# Patient Record
Sex: FEMALE | Race: WHITE | NOT HISPANIC OR LATINO | URBAN - METROPOLITAN AREA
[De-identification: names, ages, dates, MRNs, and addresses within clinical notes are randomized per-mention and may not be internally consistent; named-entity substitution may affect disease eponyms.]

---

## 2017-05-26 ENCOUNTER — OFFICE VISIT (OUTPATIENT)
Dept: URGENT CARE | Facility: RETAIL CLINIC | Age: 37
End: 2017-05-26
Payer: COMMERCIAL

## 2017-05-26 VITALS — SYSTOLIC BLOOD PRESSURE: 113 MMHG | TEMPERATURE: 97.3 F | DIASTOLIC BLOOD PRESSURE: 76 MMHG | HEART RATE: 98 BPM

## 2017-05-26 DIAGNOSIS — R09.81 NASAL CONGESTION: ICD-10-CM

## 2017-05-26 DIAGNOSIS — J02.9 ACUTE PHARYNGITIS, UNSPECIFIED ETIOLOGY: ICD-10-CM

## 2017-05-26 DIAGNOSIS — Z77.120 MOLD SUSPECTED EXPOSURE: ICD-10-CM

## 2017-05-26 LAB — S PYO AG THROAT QL IA.RAPID: NORMAL

## 2017-05-26 PROCEDURE — 99202 OFFICE O/P NEW SF 15 MIN: CPT | Performed by: NURSE PRACTITIONER

## 2017-05-26 PROCEDURE — 87880 STREP A ASSAY W/OPTIC: CPT | Mod: QW | Performed by: NURSE PRACTITIONER

## 2017-05-26 PROCEDURE — 87081 CULTURE SCREEN ONLY: CPT | Performed by: NURSE PRACTITIONER

## 2017-05-26 NOTE — PROGRESS NOTES
[facility] clinic note    SUBJECTIVE:  Grace Du is a 36 year old female who presents to [facility] with chief complaint of sore throat.    Onset of symptoms was 3 day(s) ago.    Course of illness: sudden onset and worsening.    Severity moderate to severe  Course of illness:  Current and Associated symptoms: ears are clogged, runny/stuffy nose, felt warm, glands feel swollen, hoarse voice and headache  Treatment measures tried at home include OTC meds.  Predisposing factors include None.    Current Outpatient Prescriptions   Medication     Acetaminophen (TYLENOL PO)     No current facility-administered medications for this visit.      PAST MEDICAL HISTORY:   Past Medical History:   Diagnosis Date     ASTHMA UNSPECIFIED       IRON DEFIC ANEMIA NOS        PAST SURGICAL HISTORY: No past surgical history on file.    FAMILY HISTORY: No family history on file.    SOCIAL HISTORY:   Social History   Substance Use Topics     Smoking status: Not on file     Smokeless tobacco: Not on file     Alcohol use Not on file     ROS:  Review of systems negative except as stated above.    OBJECTIVE:   Vitals:    05/26/17 1743   BP: 113/76   Pulse: 98   Temp: 97.3  F (36.3  C)   TempSrc: Oral     GENERAL APPEARANCE: alert, active, mild distress and cooperative  EYES: EOMI,  PERRL, conjunctiva clear  HENT: ear canals and TM's normal.  Nose congestion.  oral mucous membranes moist, no erythema noted  NECK: supple, non-tender to palpation, no adenopathy noted  RESP: lungs clear to auscultation - no rales, rhonchi or wheezes  CV: regular rates and rhythm, normal S1 S2, no murmur noted  ABDOMEN:  soft, nontender, no HSM or masses and bowel sounds normal  SKIN: no suspicious lesions or rashes    Rapid Strep test is negative; await throat culture results.    ASSESSMENT:     Acute pharyngitis, unspecified etiology  Nasal congestion  Mold suspected exposure      PLAN:   Outpatient  Encounter Prescriptions as of 5/26/2017   Medication Sig Dispense Refill     Acetaminophen (TYLENOL PO)        [DISCONTINUED] DEPO-PROVERA 150 MG/ML IM SUSP 1 ml every 12 weeks (Patient not taking: No sig reported) .9 0     [DISCONTINUED] DEPO-PROVERA 150 MG/ML IM SUSP 1 ml every 12 weeks (Patient not taking: No sig reported) .9 0     No facility-administered encounter medications on file as of 5/26/2017.      If not improving Follow up at:  [facility] [phone]  Encourage good hydration (mainly water), may drink tea /c honey, warm chicken broth to sooth throat.  Soft foods may be preferred for several days.  Symptomatic treatment with warm Na+ H2O gargles, OTC analgesic, etc. discussed.   Strep culture pending.   Grace Du told positive cultures called only.  Rest as needed.  Follow-up with primary care provider if not improving.    If difficulty breathing or swallowing be seen immediately in the ED.    Mark De Leon MSN, APRN, Family NP-C  [facility]

## 2017-05-26 NOTE — MR AVS SNAPSHOT
"              After Visit Summary   2017    Grace Du    MRN: [id number]           Patient Information     Date Of Birth          1980        Visit Information        Provider Department      2017 5:50 PM Mark De Leon APRN [facility]        Today's Diagnoses     Acute pharyngitis, unspecified etiology    -  1    Nasal congestion        Mold suspected exposure           Follow-ups after your visit        Who to contact     You can reach your care team any time of the day by calling [phone].  Notification of test results:  If you have an abnormal lab result, we will notify you by phone as soon as possible.         Additional Information About Your Visit        MyChart Information     [employer] lets you send messages to your doctor, view your test results, renew your prescriptions, schedule appointments and more. To sign up, go to [email or web address] . Click on \"Log in\" on the left side of the screen, which will take you to the Welcome page. Then click on \"Sign up Now\" on the right side of the page.     You will be asked to enter the access code listed below, as well as some personal information. Please follow the directions to create your username and password.     Your access code is: [email or web address]  Expires: 2017  6:06 PM     Your access code will  in 90 days. If you need help or a new code, please call your [place] clinic or [phone].        Care EveryWhere ID     This is your [facility] EveryWhere ID. This could be used by other organizations to access your [place] medical records  [id number]        Your Vitals Were     Pulse Temperature                98 97.3  F (36.3  C) (Oral)           Blood Pressure from Last 3 Encounters:   17 113/76    Weight from Last 3 Encounters:   No data found for Wt              We Performed the Following     RAPID STREP SCREEN        Primary Care Provider Office Phone # Fax #    Karissa Calderón MD " [phone]       [facility] [address] [address]        Thank you!     Thank you for choosing [facility]  for your care. Our goal is always to provide you with excellent care. Hearing back from our patients is one way we can continue to improve our services. Please take a few minutes to complete the written survey that you may receive in the mail after your visit with us. Thank you!             Your Updated Medication List - Protect others around you: Learn how to safely use, store and throw away your medicines at www.disposemymeds.org.          This list is accurate as of: 5/26/17  6:06 PM.  Always use your most recent med list.                   Brand Name Dispense Instructions for use    TYLENOL PO

## 2017-05-26 NOTE — NURSING NOTE
Chief Complaint   Patient presents with     Pharyngitis     x 3 days, pt thinks feverish, tylenol taken at noon today       Initial /76  Pulse 98  Temp 97.3  F (36.3  C) (Oral) There is no height or weight on file to calculate BMI.  Medication Reconciliation: complete

## 2017-05-30 LAB — BETA STREP CONFIRM: NORMAL

## 2017-07-07 ENCOUNTER — OFFICE VISIT (OUTPATIENT)
Dept: URGENT CARE | Facility: RETAIL CLINIC | Age: 37
End: 2017-07-07
Payer: COMMERCIAL

## 2017-07-07 VITALS
SYSTOLIC BLOOD PRESSURE: 111 MMHG | DIASTOLIC BLOOD PRESSURE: 73 MMHG | OXYGEN SATURATION: 96 % | HEART RATE: 97 BPM | TEMPERATURE: 98.6 F

## 2017-07-07 DIAGNOSIS — K04.7 DENTAL INFECTION: ICD-10-CM

## 2017-07-07 DIAGNOSIS — K08.89 PAIN, DENTAL: Primary | ICD-10-CM

## 2017-07-07 PROCEDURE — 99213 OFFICE O/P EST LOW 20 MIN: CPT | Performed by: NURSE PRACTITIONER

## 2017-07-07 RX ORDER — CEPHALEXIN 500 MG/1
500 CAPSULE ORAL 3 TIMES DAILY
Qty: 42 CAPSULE | Refills: 0 | Status: SHIPPED | OUTPATIENT
Start: 2017-07-07 | End: 2017-07-21

## 2017-07-07 NOTE — PROGRESS NOTES
SUBJECTIVE:  Grace Du is a 36 year old female here today with tooth pain left upper.   Has been going on for 1 day(s).   Relieving Factors:  Ice   Worse with: talking, and the like, smiling, etc.  Symptoms have been sudden since that time.  Prior history of related problems: Yes, currently has a broken tooth and will be seeing the dentist soon (15th).    Past Medical, social, family histories, medications, and allergies reviewed and updated  Current Outpatient Prescriptions   Medication     Acetaminophen (TYLENOL PO)     No current facility-administered medications for this visit.        OBJECTIVE:   Vitals:    07/07/17 1731   BP: 111/73   BP Location: Right arm   Patient Position: Chair   Cuff Size: Adult Regular   Pulse: 97   Temp: 98.6  F (37  C)   TempSrc: Tympanic   SpO2: 96%     Eye exam is normal - SHIRLEY, EOMI, corneas normal.  EARS: canals clear, TM retracted not injected .  Oropharyngeal exam - Good hygeine. One tooth missing.   The neck is supple and free of adenopathy or masses, the thyroid is normal without enlargement or nodules.    ASSESSMENT:     Pain, dental  Dental infection      PLAN:  Current Outpatient Prescriptions   Medication     cephALEXin (KEFLEX) 500 MG capsule     Acetaminophen (TYLENOL PO)     No current facility-administered medications for this visit.      NaCl H2O rinses  Improve oral hygeine  Follow up with dentist  Tylenol 1-2 tabs po q4h prn    Mark De Leon MSN, APRN, Family NP-C  [facility]

## 2017-07-07 NOTE — NURSING NOTE
Chief Complaint   Patient presents with     Dental Pain     upper left dental pain x 1 day hurts to bite down gum feels swollen        Initial /73 (BP Location: Right arm, Patient Position: Chair, Cuff Size: Adult Regular)  Pulse 97  Temp 98.6  F (37  C) (Tympanic)  SpO2 96% There is no height or weight on file to calculate BMI.  Medication Reconciliation: complete     Jessica Sundet

## 2017-07-07 NOTE — MR AVS SNAPSHOT
"              After Visit Summary   2017    Grace Du    MRN: [id number]           Patient Information     Date Of Birth          1980        Visit Information        Provider Department      2017 5:30 PM Mark De Leon APRN [facility]        Today's Diagnoses     Pain, dental    -  1    Dental infection           Follow-ups after your visit        Who to contact     You can reach your care team any time of the day by calling [phone].  Notification of test results:  If you have an abnormal lab result, we will notify you by phone as soon as possible.         Additional Information About Your Visit        MyChart Information     [employer] lets you send messages to your doctor, view your test results, renew your prescriptions, schedule appointments and more. To sign up, go to [email or web address] . Click on \"Log in\" on the left side of the screen, which will take you to the Welcome page. Then click on \"Sign up Now\" on the right side of the page.     You will be asked to enter the access code listed below, as well as some personal information. Please follow the directions to create your username and password.     Your access code is: [email or web address]  Expires: 2017  6:06 PM     Your access code will  in 90 days. If you need help or a new code, please call your [place] clinic or [phone].        Care EveryWhere ID     This is your Care EveryWhere ID. This could be used by other organizations to access your [place] medical records  [id number]        Your Vitals Were     Pulse Temperature Pulse Oximetry             97 98.6  F (37  C) (Tympanic) 96%          Blood Pressure from Last 3 Encounters:   17 111/73   17 113/76    Weight from Last 3 Encounters:   No data found for Wt              Today, you had the following     No orders found for display         Today's Medication Changes          These changes are accurate as of: 17  5:46 PM.  " If you have any questions, ask your nurse or doctor.               Start taking these medicines.        Dose/Directions    cephALEXin 500 MG capsule   Commonly known as:  KEFLEX   Used for:  Dental infection, Pain, dental        Dose:  500 mg   Take 1 capsule (500 mg) by mouth 3 times daily for 14 days   Quantity:  42 capsule   Refills:  0            Where to get your medicines      These medications were sent to [address] - [address]  [address], [facility] [address]     Phone:  [phone]     cephALEXin 500 MG capsule                Primary Care Provider Office Phone # Fax #    Karissa Calderón [phone]       [facility] [address] [address]        Equal Access to Services     [facility] HospitalLANRE : Hadii margi vizcaino hadasho Sorumaali, waaxda luqadaha, qaybta kaalmada adeegyada, alix jung . So [facility] [phone].    ATENCIÓN: Si habla español, tiene a anguiano disposición servicios gratuitos de asistencia lingüística. [facility] [phone].    We comply with applicable federal civil rights laws and [place] laws. We do not discriminate on the basis of race, color, national origin, age, disability sex, sexual orientation or gender identity.            Thank you!     Thank you for choosing [facility]  for your care. Our goal is always to provide you with excellent care. Hearing back from our patients is one way we can continue to improve our services. Please take a few minutes to complete the written survey that you may receive in the mail after your visit with us. Thank you!             Your Updated Medication List - Protect others around you: Learn how to safely use, store and throw away your medicines at www.disposemymeds.org.          This list is accurate as of: 7/7/17  5:46 PM.  Always use your most recent med list.                   Brand Name Dispense Instructions for use Diagnosis    cephALEXin 500  MG capsule    KEFLEX    42 capsule    Take 1 capsule (500 mg) by mouth 3 times daily for 14 days    Dental infection, Pain, dental       TYLENOL PO

## 2017-07-29 ENCOUNTER — HEALTH MAINTENANCE LETTER (OUTPATIENT)
Age: 37
End: 2017-07-29

## 2017-08-10 ENCOUNTER — HOSPITAL ENCOUNTER (EMERGENCY)
Facility: CLINIC | Age: 37
Discharge: HOME | End: 2017-08-10
Attending: EMERGENCY MEDICINE | Admitting: EMERGENCY MEDICINE
Payer: COMMERCIAL

## 2017-08-10 VITALS
RESPIRATION RATE: 16 BRPM | DIASTOLIC BLOOD PRESSURE: 77 MMHG | SYSTOLIC BLOOD PRESSURE: 117 MMHG | HEIGHT: 62 IN | HEART RATE: 80 BPM | OXYGEN SATURATION: 100 % | WEIGHT: 123 LBS | TEMPERATURE: 97.6 F | BODY MASS INDEX: 22.63 KG/M2

## 2017-08-10 DIAGNOSIS — K01.1 IMPACTED MOLAR: ICD-10-CM

## 2017-08-10 DIAGNOSIS — K12.2 ORAL CELLULITIS: ICD-10-CM

## 2017-08-10 PROCEDURE — 64400 NJX AA&/STRD TRIGEMINAL NRV: CPT | Mod: Z6 | Performed by: EMERGENCY MEDICINE

## 2017-08-10 PROCEDURE — 64400 NJX AA&/STRD TRIGEMINAL NRV: CPT | Performed by: EMERGENCY MEDICINE

## 2017-08-10 PROCEDURE — 99284 EMERGENCY DEPT VISIT MOD MDM: CPT | Mod: 25 | Performed by: EMERGENCY MEDICINE

## 2017-08-10 PROCEDURE — 99283 EMERGENCY DEPT VISIT LOW MDM: CPT | Mod: 25 | Performed by: EMERGENCY MEDICINE

## 2017-08-10 RX ORDER — BUPIVACAINE HYDROCHLORIDE AND EPINEPHRINE 2.5; 5 MG/ML; UG/ML
5 INJECTION, SOLUTION INFILTRATION; PERINEURAL ONCE
Status: DISCONTINUED | OUTPATIENT
Start: 2017-08-10 | End: 2017-08-10 | Stop reason: HOSPADM

## 2017-08-10 RX ORDER — CEPHALEXIN 500 MG/1
500 CAPSULE ORAL 4 TIMES DAILY
Qty: 28 CAPSULE | Refills: 0 | Status: SHIPPED | OUTPATIENT
Start: 2017-08-10 | End: 2017-08-17

## 2017-08-10 RX ORDER — TRAMADOL HYDROCHLORIDE 50 MG/1
50-100 TABLET ORAL EVERY 6 HOURS PRN
Qty: 15 TABLET | Refills: 0 | Status: SHIPPED | OUTPATIENT
Start: 2017-08-10

## 2017-08-10 NOTE — ED NOTES
Has been told she needs wisdom teeth removed. Unable to get into dentist ( oral surgeon ) Has been closed. To open Aug 16th. Having pain and swelling right lower back dental area. Took tylenol 325mg.. Unable to tolerate motrin.

## 2017-08-10 NOTE — ED AVS SNAPSHOT
[facility] Emergency Department    [address] [place] [address]    Phone:  [phone]    Fax:  [phone]                                       Grace Du   MRN: [id number]    Department:  [facility] Emergency Department   Date of Visit:  8/10/2017           Patient Information     Date Of Birth          1980        Your diagnoses for this visit were:     Impacted molar     Oral cellulitis        You were seen by Jd Navas MD.      Follow-up Information     Please follow up.    Why:  With a dentist as soon as possible.  List of dentists is provided.      Discharge References/Attachments     WISDOM TEETH, IMPACTED, UNDERSTANDING (ENGLISH)      24 Hour Appointment Hotline       To make an appointment at any [facility] [place] clinic, call [phone] ([phone]). If you don't have a family doctor or clinic, we will help you find one. [facility] clinics are conveniently located to serve the needs of you and your family.             Review of your medicines      START taking        Dose / Directions Last dose taken    cephALEXin 500 MG capsule   Commonly known as:  KEFLEX   Dose:  500 mg   Quantity:  28 capsule        Take 1 capsule (500 mg) by mouth 4 times daily for 7 days   Refills:  0        traMADol 50 MG tablet   Commonly known as:  ULTRAM   Dose:   mg   Quantity:  15 tablet        Take 1-2 tablets ( mg) by mouth every 6 hours as needed for pain   Refills:  0          Our records show that you are taking the medicines listed below. If these are incorrect, please call your family doctor or clinic.        Dose / Directions Last dose taken    TYLENOL PO   Dose:  325 mg        Take 325 mg by mouth every 4 hours as needed   Refills:  0                Prescriptions were sent or printed at these locations (2 Prescriptions)                   [facility] - [place], [place] - [address]   [address] [address]    Telephone:  [phone]   Fax:   "[phone]   Hours:                  Printed at Department/Unit printer (2 of 2)         traMADol (ULTRAM) 50 MG tablet               cephALEXin (KEFLEX) 500 MG capsule                Orders Needing Specimen Collection     None      Pending Results     No orders found from 2017 to 2017.            Pending Culture Results     No orders found from 2017 to 2017.            Pending Results Instructions     If you had any lab results that were not finalized at the time of your Discharge, you can call the ED Lab Result RN at [phone]. You will be contacted by this team for any positive Lab results or changes in treatment. The nurses are available 7 days a week from 10A to 6:30P.  You can leave a message 24 hours per day and they will return your call.        Thank you for choosing [facility]       Thank you for choosing [facility] for your care. Our goal is always to provide you with excellent care. Hearing back from our patients is one way we can continue to improve our services. Please take a few minutes to complete the written survey that you may receive in the mail after you visit with us. Thank you!        [employer] Information     [employer] lets you send messages to your doctor, view your test results, renew your prescriptions, schedule appointments and more. To sign up, go to [email or web address] . Click on \"Log in\" on the left side of the screen, which will take you to the Welcome page. Then click on \"Sign up Now\" on the right side of the page.     You will be asked to enter the access code listed below, as well as some personal information. Please follow the directions to create your username and password.     Your access code is: [email or web address]  Expires: 2017  6:06 PM     Your access code will  in 90 days. If you need help or a new code, please call your [facility] clinic or [phone].        Care EveryWhere ID     This is your Care EveryWhere ID. This could be used by other " organizations to access your [place] medical records  [id number]        Equal Access to Services     UNA LEÓN : Florian Yepez, cynthia aburto, alix pace. So [facility] [phone].    ATENCIÓN: Si habla español, tiene a anguiano disposición servicios gratuitos de asistencia lingüística. Llame al [phone].    We comply with applicable federal civil rights laws and [place] laws. We do not discriminate on the basis of race, color, national origin, age, disability sex, sexual orientation or gender identity.            After Visit Summary       This is your record. Keep this with you and show to your community pharmacist(s) and doctor(s) at your next visit.

## 2017-08-10 NOTE — ED PROVIDER NOTES
History     Chief Complaint   Patient presents with     Dental Pain     HPI  Grace Du is a 37 year old female who presents with complaints of right lower molar pain.  Patient has been previously told she needs a extraction of her lower molars due to impaction.  She was recently seen for similar complaints and treated with Keflex for suspected infection.  She presents today with moderate right lower molar pain.  She has no fever or chills.  No difficulty speech or swallowing.  No malocclusion but hurts to bite down.  She has no facial or neck swelling.  She is intolerant of ibuprofen.  She took Tylenol prior to arrival.  Patient was referred to an oral surgeon but unfortunately the surgeon's office is closed due to a family emergency.  Her appointment is not until August 16.    I have reviewed the Medications, Allergies, Past Medical and Surgical History, and Social History in the Epic system.         Review of Systems all other systems reviewed and are negative.  Past Medical History:   Diagnosis Date     Iron deficiency anemia, unspecified      Unspecified asthma(493.90)      There is no problem list on file for this patient.    Current Facility-Administered Medications   Medication     bupivacaine 0.25 % - EPINEPHrine 1:200,000 injection 5 mL     Current Outpatient Prescriptions   Medication     traMADol (ULTRAM) 50 MG tablet     cephALEXin (KEFLEX) 500 MG capsule     Acetaminophen (TYLENOL PO)        Allergies   Allergen Reactions     Erythromycin      Penicillins      Sulfa Drugs      Social History     Social History     Marital status: Single     Spouse name: N/A     Number of children: N/A     Years of education: N/A     Occupational History     Not on file.     Social History Main Topics     Smoking status: Never Smoker     Smokeless tobacco: Never Used     Alcohol use No     Drug use: No     Sexual activity: Not on file     Other Topics Concern     Not on file     Social History Narrative     No  "family history on file.    Physical Exam   BP: 117/77  Pulse: 80  Temp: 97.6  F (36.4  C)  Resp: 16  Height: 157.5 cm (5' 2\")  Weight: 55.8 kg (123 lb)  SpO2: 100 %  Physical Exam Gen. alert cooperative female in mild to moderate distress.  HEENT shows no obvious facial swelling or asymmetry.  Orally there is partially erupted lower molars bilaterally.  On the right she has swelling of the surrounding tissue with scant blood.  On palpation there is tenderness but no fluctuance.  The tooth itself appears intact without caries or any evidence of fracture.  Patient does not have malocclusion.  No trismus.  Neck is supple without limitation.  No adenopathy and no stridor.    ED Course     ED Course     Procedures       patient was given a mandibular block of bupivacaine with epinephrine.  Total of 2 cc was used.       Critical Care time:  none               Labs Ordered and Resulted from Time of ED Arrival Up to the Time of Departure from the ED - No data to display    Assessments & Plan (with Medical Decision Making)   Patient is a 37-year-old female presents with right lower dental pain.  She's been pursued told she needs her wisdom teeth removed.  No fever or chills.  No trismus or malocclusion.  Patient is intolerant of ibuprofen and took Tylenol for pain.  On exam she has no facial or neck swelling.  No meningismus.  Orally she has partially erupted wisdom teeth bilaterally on the lower aspect.  On the right there is surrounding swelling of the gum with scant blood noted.  No fluctuance on palpation.  Patient was given a mandibular block as above.  She'll be placed on Keflex for 7 days.  She is tolerating this before without adverse reaction.  We'll give her limited number of Ultram for additional pain control.  She states she cannot get into an oral surgeon that was recommended due to the surgeon's office being closed from a family emergency.  We've given her a list of emergency dentist that she can contact us if " she can get in sooner.  Reasons to return to the burns were discussed.  I have reviewed the nursing notes.    I have reviewed the findings, diagnosis, plan and need for follow up with the patient.       New Prescriptions    CEPHALEXIN (KEFLEX) 500 MG CAPSULE    Take 1 capsule (500 mg) by mouth 4 times daily for 7 days    TRAMADOL (ULTRAM) 50 MG TABLET    Take 1-2 tablets ( mg) by mouth every 6 hours as needed for pain       Final diagnoses:   Impacted molar   Oral cellulitis       8/10/2017   [facility] EMERGENCY DEPARTMENT     Jd Navas MD  08/10/17 8304

## 2017-08-10 NOTE — ED AVS SNAPSHOT
[facility] Emergency Department    [address] [place] [address]    Phone:  [phone]    Fax:  [phone]                                       Grace Du   MRN: [id number]    Department:  [facility] Emergency Department   Date of Visit:  8/10/2017           After Visit Summary Signature Page     I have received my discharge instructions, and my questions have been answered. I have discussed any challenges I see with this plan with the nurse or doctor.    ..........................................................................................................................................  Patient/Patient Representative Signature      ..........................................................................................................................................  Patient Representative Print Name and Relationship to Patient    ..................................................               ................................................  Date                                            Time    ..........................................................................................................................................  Reviewed by Signature/Title    ...................................................              ..............................................  Date                                                            Time

## 2022-05-11 ENCOUNTER — LAB (OUTPATIENT)
Dept: URGENT CARE | Facility: URGENT CARE | Age: 42
End: 2022-05-11
Payer: COMMERCIAL

## 2022-05-11 DIAGNOSIS — Z20.822 SUSPECTED COVID-19 VIRUS INFECTION: ICD-10-CM

## 2022-05-11 DIAGNOSIS — J06.9 UPPER RESPIRATORY TRACT INFECTION, UNSPECIFIED TYPE: Primary | ICD-10-CM

## 2022-05-11 LAB
DEPRECATED S PYO AG THROAT QL EIA: NEGATIVE
FLUAV AG SPEC QL IA: NEGATIVE
FLUBV AG SPEC QL IA: NEGATIVE
GROUP A STREP BY PCR: NOT DETECTED

## 2022-05-11 PROCEDURE — U0005 INFEC AGEN DETEC AMPLI PROBE: HCPCS

## 2022-05-11 PROCEDURE — 87651 STREP A DNA AMP PROBE: CPT | Performed by: EMERGENCY MEDICINE

## 2022-05-11 PROCEDURE — U0003 INFECTIOUS AGENT DETECTION BY NUCLEIC ACID (DNA OR RNA); SEVERE ACUTE RESPIRATORY SYNDROME CORONAVIRUS 2 (SARS-COV-2) (CORONAVIRUS DISEASE [COVID-19]), AMPLIFIED PROBE TECHNIQUE, MAKING USE OF HIGH THROUGHPUT TECHNOLOGIES AS DESCRIBED BY CMS-2020-01-R: HCPCS

## 2022-05-11 PROCEDURE — 87804 INFLUENZA ASSAY W/OPTIC: CPT | Performed by: EMERGENCY MEDICINE

## 2022-05-12 LAB — SARS-COV-2 RNA RESP QL NAA+PROBE: NEGATIVE

## 2022-06-25 ENCOUNTER — HEALTH MAINTENANCE LETTER (OUTPATIENT)
Age: 42
End: 2022-06-25

## 2022-11-20 ENCOUNTER — HEALTH MAINTENANCE LETTER (OUTPATIENT)
Age: 42
End: 2022-11-20

## 2023-07-08 ENCOUNTER — HEALTH MAINTENANCE LETTER (OUTPATIENT)
Age: 43
End: 2023-07-08

## 2024-02-03 ENCOUNTER — HEALTH MAINTENANCE LETTER (OUTPATIENT)
Age: 44
End: 2024-02-03

## 2024-08-31 ENCOUNTER — HEALTH MAINTENANCE LETTER (OUTPATIENT)
Age: 44
End: 2024-08-31